# Patient Record
Sex: FEMALE | Race: WHITE | NOT HISPANIC OR LATINO | Employment: UNEMPLOYED | URBAN - METROPOLITAN AREA
[De-identification: names, ages, dates, MRNs, and addresses within clinical notes are randomized per-mention and may not be internally consistent; named-entity substitution may affect disease eponyms.]

---
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
## 2023-11-20 ENCOUNTER — OFFICE VISIT (OUTPATIENT)
Dept: FAMILY MEDICINE CLINIC | Facility: CLINIC | Age: 18
End: 2023-11-20
Payer: COMMERCIAL

## 2023-11-20 VITALS
RESPIRATION RATE: 16 BRPM | HEART RATE: 84 BPM | BODY MASS INDEX: 32.04 KG/M2 | SYSTOLIC BLOOD PRESSURE: 114 MMHG | WEIGHT: 211.4 LBS | HEIGHT: 68 IN | OXYGEN SATURATION: 98 % | DIASTOLIC BLOOD PRESSURE: 76 MMHG

## 2023-11-20 DIAGNOSIS — R05.3 CHRONIC COUGH: ICD-10-CM

## 2023-11-20 DIAGNOSIS — E55.9 VITAMIN D DEFICIENCY: ICD-10-CM

## 2023-11-20 DIAGNOSIS — J30.89 ENVIRONMENTAL AND SEASONAL ALLERGIES: ICD-10-CM

## 2023-11-20 DIAGNOSIS — R63.5 WEIGHT GAIN: ICD-10-CM

## 2023-11-20 DIAGNOSIS — Z00.00 ANNUAL PHYSICAL EXAM: ICD-10-CM

## 2023-11-20 DIAGNOSIS — Z76.89 ENCOUNTER TO ESTABLISH CARE: Primary | ICD-10-CM

## 2023-11-20 DIAGNOSIS — L65.9 HAIR LOSS: ICD-10-CM

## 2023-11-20 DIAGNOSIS — L70.9 ACNE, UNSPECIFIED ACNE TYPE: ICD-10-CM

## 2023-11-20 RX ORDER — MONTELUKAST SODIUM 10 MG/1
10 TABLET ORAL NIGHTLY
Qty: 30 TABLET | Refills: 1 | Status: SHIPPED | OUTPATIENT
Start: 2023-11-20 | End: 2023-11-20

## 2023-11-20 RX ORDER — MONTELUKAST SODIUM 10 MG/1
10 TABLET ORAL NIGHTLY
Qty: 90 TABLET | Refills: 1 | Status: SHIPPED | OUTPATIENT
Start: 2023-11-20

## 2023-11-20 RX ORDER — ADAPALENE AND BENZOYL PEROXIDE GEL, 0.1%/2.5% 1; 25 MG/G; MG/G
1 GEL TOPICAL NIGHTLY
Qty: 45 G | Refills: 2 | Status: SHIPPED | OUTPATIENT
Start: 2023-11-20

## 2023-11-20 NOTE — PROGRESS NOTES
Chief Complaint  Follow-up (Discuss PCOS/Depo shot)    Subjective          Ivonne Yañez presents to [facility]  History of Present Illness    Is a new patient, here today to establish care, annual physical, and to discuss possible pcos   She has been trying to lose weight and has been unable to   She endorses hair loss and acne    With use of the depo provera she is not having periods  Acne is not improved and weight is still a problem, she cannot say that the hair loss is better or worse  She is not taking any vitamins or supplements    She is a  and is playing collegiate ball for [employer]    She has been getting the depo shots through student health at school    She does exercise regularly, eats a fairly healthy diet    Review of Systems   Constitutional: Negative.  Negative for appetite change, fatigue and fever.   Respiratory:  Positive for cough. Negative for shortness of breath.         Has been having a cough since September, the cough is dry, productive at times, mostly in the mornings, she feels like it gets worse when she is at home from school   Cardiovascular:  Negative for chest pain and palpitations.   Gastrointestinal:  Positive for diarrhea. Negative for abdominal pain.        Endorses that she has both constipation and diarrhea, more diarrhea  She also has occasional reflux, has made diet changes with improvements   Genitourinary:  Negative for dyspareunia, dysuria, frequency and urgency.        Endorses that she has chronic UTI, seems to have a UTI every couple of months   Musculoskeletal: Negative.  Negative for arthralgias and myalgias.   Allergic/Immunologic: Positive for environmental allergies.   Neurological:  Positive for headaches. Negative for dizziness and weakness.        Endorses that she has headches on occasional, seem to happen with increased stress   Psychiatric/Behavioral:  Positive for dysphoric mood. Negative for sleep  "disturbance. The patient is nervous/anxious.         She has an emotional support dog to help with her depression and anxiety     Objective   Vital Signs:  /76 (BP Location: Left arm, Patient Position: Sitting, Cuff Size: Adult)   Pulse 84   Resp 16   Ht 172.7 cm (68\")   Wt 95.9 kg (211 lb 6.4 oz)   SpO2 98%   BMI 32.14 kg/m²     BP Readings from Last 3 Encounters:   11/20/23 114/76   08/01/22 114/66   07/27/22 124/84        Wt Readings from Last 3 Encounters:   11/20/23 95.9 kg (211 lb 6.4 oz)   08/01/22 90.7 kg (200 lb) (97%, Z= 1.96)*   07/27/22 90.7 kg (200 lb) (97%, Z= 1.96)*     * Growth percentiles are based on [facility] (Girls, 2-20 Years) data.        BMI is >= 30 and <35. (Class 1 Obesity). The following options were offered after discussion;: exercise counseling/recommendations and nutrition counseling/recommendations      Physical Exam  Vitals reviewed.   Constitutional:       Appearance: Normal appearance.   HENT:      Right Ear: Tympanic membrane and ear canal normal.      Left Ear: Tympanic membrane and ear canal normal.      Nose: Nose normal.      Mouth/Throat:      Mouth: Mucous membranes are moist.      Pharynx: Oropharynx is clear.   Eyes:      Extraocular Movements: Extraocular movements intact.   Neck:      Thyroid: No thyromegaly.      Vascular: No carotid bruit.   Cardiovascular:      Rate and Rhythm: Normal rate and regular rhythm.      Pulses: Normal pulses.      Heart sounds: Normal heart sounds.   Pulmonary:      Effort: Pulmonary effort is normal.      Breath sounds: Normal breath sounds.   Abdominal:      General: Bowel sounds are normal.      Palpations: Abdomen is soft.      Tenderness: There is no abdominal tenderness. There is no right CVA tenderness or left CVA tenderness.   Musculoskeletal:         General: Normal range of motion.      Cervical back: Neck supple. No tenderness.      Right lower leg: No edema.      Left lower leg: No edema.   Lymphadenopathy:      Cervical: " No cervical adenopathy.   Skin:     General: Skin is warm.   Neurological:      Mental Status: She is alert and oriented to person, place, and time.   Psychiatric:         Mood and Affect: Mood normal.        Result Review :                 Assessment and Plan    Diagnoses and all orders for this visit:    1. Encounter to establish care (Primary)    2. Annual physical exam  -     Comprehensive Metabolic Panel; Future  -     CBC (No Diff); Future  -     Urinalysis With Culture If Indicated - Urine, Clean Catch; Future  -     Lipid Panel; Future  -     TSH Rfx On Abnormal To Free T4; Future  -     Vitamin D,25-Hydroxy; Future  -     Hemoglobin A1c; Future    3. Weight gain  -     Ambulatory Referral to Endocrinology    4. Hair loss  -     Ambulatory Referral to Endocrinology    5. Acne, unspecified acne type  -     Adapalene-Benzoyl Peroxide 0.1-2.5 % gel; Apply 1 Application topically to the appropriate area as directed Every Night.  Dispense: 45 g; Refill: 2    6. Chronic cough  -     montelukast (Singulair) 10 MG tablet; Take 1 tablet by mouth Every Night.  Dispense: 30 tablet; Refill: 1    7. Environmental and seasonal allergies  -     montelukast (Singulair) 10 MG tablet; Take 1 tablet by mouth Every Night.  Dispense: 30 tablet; Refill: 1           Follow Up   Return in about 1 year (around 11/20/2024), or as needed, for Annual physical.  Patient was given instructions and counseling regarding her condition or for health maintenance advice. Please see specific information pulled into the AVS if appropriate.

## 2023-11-21 ENCOUNTER — LAB (OUTPATIENT)
Dept: FAMILY MEDICINE CLINIC | Facility: CLINIC | Age: 18
End: 2023-11-21
Payer: COMMERCIAL

## 2023-11-21 PROCEDURE — 85027 COMPLETE CBC AUTOMATED: CPT | Performed by: NURSE PRACTITIONER

## 2023-11-21 PROCEDURE — 81001 URINALYSIS AUTO W/SCOPE: CPT | Performed by: NURSE PRACTITIONER

## 2023-11-21 PROCEDURE — 80053 COMPREHEN METABOLIC PANEL: CPT | Performed by: NURSE PRACTITIONER

## 2023-11-21 PROCEDURE — 84443 ASSAY THYROID STIM HORMONE: CPT | Performed by: NURSE PRACTITIONER

## 2023-11-21 PROCEDURE — 83036 HEMOGLOBIN GLYCOSYLATED A1C: CPT | Performed by: NURSE PRACTITIONER

## 2023-11-21 PROCEDURE — 80061 LIPID PANEL: CPT | Performed by: NURSE PRACTITIONER

## 2023-11-21 PROCEDURE — 82306 VITAMIN D 25 HYDROXY: CPT | Performed by: NURSE PRACTITIONER

## 2023-11-22 RX ORDER — ERGOCALCIFEROL 1.25 MG/1
50000 CAPSULE ORAL WEEKLY
Qty: 12 CAPSULE | Refills: 0 | Status: SHIPPED | OUTPATIENT
Start: 2023-11-22

## 2025-05-28 ENCOUNTER — PATIENT ROUNDING (BHMG ONLY) (OUTPATIENT)
Dept: URGENT CARE | Facility: CLINIC | Age: 20
End: 2025-05-28
Payer: COMMERCIAL

## 2025-05-28 NOTE — ED NOTES
Thank you for letting us care for you in your recent visit to our urgent care center. We would love to hear about your experience with us. Was this the first time you have visited our location?    We’re always looking for ways to make our patients’ experiences even better. Do you have any recommendations on ways we may improve?     I appreciate you taking the time to respond. Please be on the lookout for a survey about your recent visit from [employer] via text or email. We would greatly appreciate if you could fill that out and turn it back in. We want your voice to be heard and we value your feedback.   Thank you for choosing [facility] for your healthcare needs.     [facility] Practice Manager